# Patient Record
Sex: MALE | Race: WHITE | NOT HISPANIC OR LATINO | URBAN - METROPOLITAN AREA
[De-identification: names, ages, dates, MRNs, and addresses within clinical notes are randomized per-mention and may not be internally consistent; named-entity substitution may affect disease eponyms.]

---

## 2018-07-13 ENCOUNTER — EMERGENCY (EMERGENCY)
Facility: HOSPITAL | Age: 53
LOS: 1 days | Discharge: HOME | End: 2018-07-13
Attending: EMERGENCY MEDICINE | Admitting: EMERGENCY MEDICINE
Payer: COMMERCIAL

## 2018-07-13 VITALS
RESPIRATION RATE: 18 BRPM | SYSTOLIC BLOOD PRESSURE: 109 MMHG | DIASTOLIC BLOOD PRESSURE: 67 MMHG | TEMPERATURE: 99 F | HEIGHT: 74 IN | OXYGEN SATURATION: 100 % | HEART RATE: 64 BPM | WEIGHT: 210.1 LBS

## 2018-07-13 DIAGNOSIS — S61.011A LACERATION WITHOUT FOREIGN BODY OF RIGHT THUMB WITHOUT DAMAGE TO NAIL, INITIAL ENCOUNTER: ICD-10-CM

## 2018-07-13 PROCEDURE — 12001 RPR S/N/AX/GEN/TRNK 2.5CM/<: CPT

## 2018-07-13 PROCEDURE — 99283 EMERGENCY DEPT VISIT LOW MDM: CPT | Mod: 25

## 2018-07-13 PROCEDURE — 99282 EMERGENCY DEPT VISIT SF MDM: CPT | Mod: 25

## 2018-07-13 PROCEDURE — 90471 IMMUNIZATION ADMIN: CPT

## 2018-07-13 PROCEDURE — 90715 TDAP VACCINE 7 YRS/> IM: CPT

## 2018-07-13 RX ORDER — TETANUS TOXOID, REDUCED DIPHTHERIA TOXOID AND ACELLULAR PERTUSSIS VACCINE, ADSORBED 5; 2.5; 8; 8; 2.5 [IU]/.5ML; [IU]/.5ML; UG/.5ML; UG/.5ML; UG/.5ML
0.5 SUSPENSION INTRAMUSCULAR ONCE
Qty: 0 | Refills: 0 | Status: COMPLETED | OUTPATIENT
Start: 2018-07-13 | End: 2018-07-13

## 2018-07-13 RX ADMIN — TETANUS TOXOID, REDUCED DIPHTHERIA TOXOID AND ACELLULAR PERTUSSIS VACCINE, ADSORBED 0.5 MILLILITER(S): 5; 2.5; 8; 8; 2.5 SUSPENSION INTRAMUSCULAR at 23:54

## 2018-07-13 NOTE — ED ADULT NURSE NOTE - OBJECTIVE STATEMENT
Pt presents with laceration in space between 1st and 2nd finger of right hand. pt states he was washing dishes and cut himself with a knife, moving all fingers purposefully on injured hand, bleeding controlled on arrival

## 2018-07-14 NOTE — ED PROVIDER NOTE - ATTENDING CONTRIBUTION TO CARE
pt co cut to R thumb today just PTA. assoc c bleeding no numbnesstingling/weakness    exam: R 1st finger c 1.5cm linear lac over prox phalanx, palmar side. slight bleeding. no tendon/ligament involvement.  5/5 strength flex/extension IP/MCP.    AP thumb lac. no tendon/ligament involvement. suture closure.

## 2018-07-14 NOTE — ED PROVIDER NOTE - PLAN OF CARE
Follow up with your primary care provider within 48-72 hours for wound check. Keep sutures covered and dry for 24 hours then clean with soap and water daily.  Apply bacitracin and cover.  Return to ED for suture removal 7 days. Any increased pain, redness, streaking (red lines), swelling, fever, chills return to ER.

## 2018-11-14 ENCOUNTER — APPOINTMENT (OUTPATIENT)
Dept: INTERNAL MEDICINE | Facility: CLINIC | Age: 53
End: 2018-11-14
Payer: COMMERCIAL

## 2018-11-14 ENCOUNTER — NON-APPOINTMENT (OUTPATIENT)
Age: 53
End: 2018-11-14

## 2018-11-14 VITALS
HEIGHT: 74 IN | WEIGHT: 212 LBS | BODY MASS INDEX: 27.21 KG/M2 | DIASTOLIC BLOOD PRESSURE: 74 MMHG | SYSTOLIC BLOOD PRESSURE: 124 MMHG | RESPIRATION RATE: 15 BRPM | HEART RATE: 68 BPM

## 2018-11-14 VITALS
TEMPERATURE: 97.9 F | SYSTOLIC BLOOD PRESSURE: 111 MMHG | WEIGHT: 212 LBS | DIASTOLIC BLOOD PRESSURE: 70 MMHG | HEART RATE: 88 BPM | RESPIRATION RATE: 14 BRPM | BODY MASS INDEX: 27.21 KG/M2 | OXYGEN SATURATION: 100 % | HEIGHT: 74 IN

## 2018-11-14 DIAGNOSIS — Z12.11 ENCOUNTER FOR SCREENING FOR MALIGNANT NEOPLASM OF COLON: ICD-10-CM

## 2018-11-14 DIAGNOSIS — M25.561 PAIN IN RIGHT KNEE: ICD-10-CM

## 2018-11-14 DIAGNOSIS — R00.2 PALPITATIONS: ICD-10-CM

## 2018-11-14 DIAGNOSIS — Z78.9 OTHER SPECIFIED HEALTH STATUS: ICD-10-CM

## 2018-11-14 DIAGNOSIS — Z83.6 FAMILY HISTORY OF OTHER DISEASES OF THE RESPIRATORY SYSTEM: ICD-10-CM

## 2018-11-14 PROCEDURE — 93000 ELECTROCARDIOGRAM COMPLETE: CPT

## 2018-11-14 PROCEDURE — 99213 OFFICE O/P EST LOW 20 MIN: CPT | Mod: 25

## 2018-11-14 PROCEDURE — 99386 PREV VISIT NEW AGE 40-64: CPT | Mod: 25

## 2018-11-14 NOTE — HEALTH RISK ASSESSMENT
[No falls in past year] : Patient reported no falls in the past year [0] : 2) Feeling down, depressed, or hopeless: Not at all (0) [[id number]] : 0

## 2018-11-14 NOTE — PHYSICAL EXAM
[No Acute Distress] : no acute distress [Well Nourished] : well nourished [Well Developed] : well developed [Well-Appearing] : well-appearing [Normal Voice/Communication] : normal voice/communication [Normal Sclera/Conjunctiva] : normal sclera/conjunctiva [PERRL] : pupils equal round and reactive to light [EOMI] : extraocular movements intact [Normal Outer Ear/Nose] : the outer ears and nose were normal in appearance [Normal Oropharynx] : the oropharynx was normal [No JVD] : no jugular venous distention [Supple] : supple [No Lymphadenopathy] : no lymphadenopathy [Thyroid Normal, No Nodules] : the thyroid was normal and there were no nodules present [No Respiratory Distress] : no respiratory distress  [Clear to Auscultation] : lungs were clear to auscultation bilaterally [No Accessory Muscle Use] : no accessory muscle use [Normal Rate] : normal rate  [Regular Rhythm] : with a regular rhythm [Normal S1, S2] : normal S1 and S2 [No Murmur] : no murmur heard [No Carotid Bruits] : no carotid bruits [No Abdominal Bruit] : a ~M bruit was not heard ~T in the abdomen [No Varicosities] : no varicosities [Pedal Pulses Present] : the pedal pulses are present [No Edema] : there was no peripheral edema [No Extremity Clubbing/Cyanosis] : no extremity clubbing/cyanosis [No Palpable Aorta] : no palpable aorta [Soft] : abdomen soft [Non Tender] : non-tender [Non-distended] : non-distended [No Masses] : no abdominal mass palpated [No HSM] : no HSM [Normal Bowel Sounds] : normal bowel sounds [Normal Posterior Cervical Nodes] : no posterior cervical lymphadenopathy [Normal Anterior Cervical Nodes] : no anterior cervical lymphadenopathy [No CVA Tenderness] : no CVA  tenderness [No Spinal Tenderness] : no spinal tenderness [No Joint Swelling] : no joint swelling [Grossly Normal Strength/Tone] : grossly normal strength/tone [No Rash] : no rash [Normal Gait] : normal gait [Coordination Grossly Intact] : coordination grossly intact [No Focal Deficits] : no focal deficits [Deep Tendon Reflexes (DTR)] : deep tendon reflexes were 2+ and symmetric [Normal Affect] : the affect was normal [Normal Insight/Judgement] : insight and judgment were intact [Normal TMs] : both tympanic membranes were normal [Normal Nasal Mucosa] : the nasal mucosa was normal [Declined Breast Exam] : declined breast exam

## 2018-11-14 NOTE — ASSESSMENT
[FreeTextEntry1] : Physical exam shows a well-developed male in no acute distress blood pressure 124/74 weight 212 pounds heart rate 68 respirations 15 HEENT was unremarkable chest was clear cardiovascular exam shows a normal S1 and S2 with no extra heart sounds or murmurs abdomen was soft and nontender extremities shows no clubbing cyanosis or edema neurologic exam was nonfocal EKG showed a normal sinus rhythm with no acute ST-T wave changes/normal record a colonoscopy was scheduled in [facility] prep was gone over with the patient and all his questions were answered he is aware of the need for a ride home from the procedure to stop all blood thinning medications herbs and vitamins 7 days before the risk of perforation and bleeding were reviewed and he is aware of these risks he is up-to-date with his ophthalmologist and dermatologists slip was given for complete blood tests including PSA lipid profile and we will do an A1c CBC full chemistries and a D3 and B12

## 2018-11-14 NOTE — HISTORY OF PRESENT ILLNESS
[FreeTextEntry1] : 53-year-old man comes in for a comprehensive physical examination and to get a slip for blood tests and to schedule a colonoscopy [de-identified] : 53-year-old man comes to the office for a comprehensive physical examination he denies temperature chills sweats myalgias headache sinus congestion sore throat cough wheezing chest pain pleurisy shortness of breath exertional dyspnea lightheadedness vertigo dizziness or syncope he's had no dysuria no abdominal pain nausea vomiting diarrhea bright red blood per rectum or black stools he denies significant musculoskeletal pain although he has chronic pain in his right knee he has had previous surgery for torn meniscus 3 times appetite has been good his weight is stable he is an active man who exercises regularly

## 2018-11-14 NOTE — REVIEW OF SYSTEMS
[Nocturia] : nocturia [Joint Pain] : joint pain [Fever] : no fever [Chills] : no chills [Fatigue] : no fatigue [Hot Flashes] : no hot flashes [Night Sweats] : no night sweats [Recent Change In Weight] : ~T no recent weight change [Discharge] : no discharge [Pain] : no pain [Redness] : no redness [Dryness] : no dryness [Vision Problems] : no vision problems [Itching] : no itching [Earache] : no earache [Hearing Loss] : no hearing loss [Nosebleeds] : no nosebleeds [Postnasal Drip] : postnasal drip [Nasal Discharge] : no nasal discharge [Sore Throat] : no sore throat [Hoarseness] : no hoarseness [Chest Pain] : no chest pain [Palpitations] : no palpitations [Claudication] : no  leg claudication [Lower Ext Edema] : no lower extremity edema [Orthopena] : no orthopnea [Paroysmal Nocturnal Dyspnea] : no paroysmal nocturnal dyspnea [Shortness Of Breath] : no shortness of breath [Wheezing] : no wheezing [Cough] : no cough [Dyspnea on Exertion] : not dyspnea on exertion [Abdominal Pain] : no abdominal pain [Nausea] : no nausea [Constipation] : no constipation [Diarrhea] : no diarrhea [Vomiting] : no vomiting [Heartburn] : no heartburn [Melena] : no melena [Dysuria] : no dysuria [Incontinence] : no incontinence [Hesitancy] : no hesitancy [Hematuria] : no hematuria [Frequency] : no frequency [Impotence] : no impotency [Poor Libido] : libido not poor [Joint Stiffness] : no joint stiffness [Muscle Pain] : no muscle pain [Muscle Weakness] : no muscle weakness [Back Pain] : no back pain [Joint Swelling] : no joint swelling [Mole Changes] : no mole changes [Nail Changes] : no nail changes [Hair Changes] : no hair changes [Skin Rash] : no skin rash [Headache] : no headache [Dizziness] : no dizziness [Fainting] : no fainting [Confusion] : no confusion [Unsteady Walk] : no ataxia [Memory Loss] : no memory loss [Suicidal] : not suicidal [Insomnia] : no insomnia [Anxiety] : no anxiety [Depression] : no depression [Easy Bleeding] : no easy bleeding [Swollen Glands] : no swollen glands

## 2019-01-17 ENCOUNTER — MEDICATION RENEWAL (OUTPATIENT)
Age: 54
End: 2019-01-17

## 2019-01-17 DIAGNOSIS — Z20.828 CONTACT WITH AND (SUSPECTED) EXPOSURE TO OTHER VIRAL COMMUNICABLE DISEASES: ICD-10-CM

## 2019-01-17 RX ORDER — OSELTAMIVIR PHOSPHATE 75 MG/1
75 CAPSULE ORAL DAILY
Qty: 10 | Refills: 0 | Status: ACTIVE | COMMUNITY
Start: 2019-01-17 | End: 1900-01-01

## 2019-01-18 LAB
25(OH)D3 SERPL-MCNC: 18.6 NG/ML
ALBUMIN SERPL ELPH-MCNC: 4.2 G/DL
ALP BLD-CCNC: 38 U/L
ALT SERPL-CCNC: 13 U/L
ANION GAP SERPL CALC-SCNC: 11 MMOL/L
APPEARANCE: CLEAR
AST SERPL-CCNC: 13 U/L
BASOPHILS # BLD AUTO: 0.04 K/UL
BASOPHILS NFR BLD AUTO: 0.6 %
BILIRUB SERPL-MCNC: 0.8 MG/DL
BILIRUBIN URINE: NEGATIVE
BLOOD URINE: NEGATIVE
BUN SERPL-MCNC: 11 MG/DL
CALCIUM SERPL-MCNC: 9.3 MG/DL
CHLORIDE SERPL-SCNC: 100 MMOL/L
CHOLEST SERPL-MCNC: 191 MG/DL
CHOLEST/HDLC SERPL: 2.7 RATIO
CO2 SERPL-SCNC: 28 MMOL/L
COLOR: YELLOW
CREAT SERPL-MCNC: 1.17 MG/DL
CRP SERPL HS-MCNC: <0.2 MG/L
EOSINOPHIL # BLD AUTO: 0.09 K/UL
EOSINOPHIL NFR BLD AUTO: 1.4 %
GLUCOSE BS SERPL-MCNC: 78 MG/DL
GLUCOSE QUALITATIVE U: NEGATIVE MG/DL
GLUCOSE SERPL-MCNC: 81 MG/DL
HBA1C MFR BLD HPLC: 5.4 %
HCT VFR BLD CALC: 46.4 %
HDLC SERPL-MCNC: 71 MG/DL
HGB BLD-MCNC: 15.2 G/DL
IMM GRANULOCYTES NFR BLD AUTO: 0.3 %
KETONES URINE: NEGATIVE
LDLC SERPL CALC-MCNC: 107 MG/DL
LEUKOCYTE ESTERASE URINE: NEGATIVE
LYMPHOCYTES # BLD AUTO: 1.82 K/UL
LYMPHOCYTES NFR BLD AUTO: 29.3 %
MAN DIFF?: NORMAL
MCHC RBC-ENTMCNC: 29.9 PG
MCHC RBC-ENTMCNC: 32.8 GM/DL
MCV RBC AUTO: 91.3 FL
MONOCYTES # BLD AUTO: 0.69 K/UL
MONOCYTES NFR BLD AUTO: 11.1 %
NEUTROPHILS # BLD AUTO: 3.55 K/UL
NEUTROPHILS NFR BLD AUTO: 57.3 %
NITRITE URINE: NEGATIVE
PH URINE: 7
PLATELET # BLD AUTO: 298 K/UL
POTASSIUM SERPL-SCNC: 3.9 MMOL/L
PROT SERPL-MCNC: 6.9 G/DL
PROTEIN URINE: NEGATIVE MG/DL
PSA FREE FLD-MCNC: 46 %
PSA FREE SERPL-MCNC: 0.15 NG/ML
PSA SERPL-MCNC: 0.33 NG/ML
RBC # BLD: 5.08 M/UL
RBC # FLD: 13.2 %
SODIUM SERPL-SCNC: 139 MMOL/L
SPECIFIC GRAVITY URINE: 1.01
T4 FREE SERPL-MCNC: 1.2 NG/DL
TRIGL SERPL-MCNC: 65 MG/DL
TSH SERPL-ACNC: 3.4 UIU/ML
UROBILINOGEN URINE: NEGATIVE MG/DL
VIT B12 SERPL-MCNC: 259 PG/ML
WBC # FLD AUTO: 6.21 K/UL

## 2020-05-29 ENCOUNTER — EMERGENCY (EMERGENCY)
Facility: HOSPITAL | Age: 55
LOS: 1 days | Discharge: HOME | End: 2020-05-29
Attending: EMERGENCY MEDICINE | Admitting: EMERGENCY MEDICINE
Payer: COMMERCIAL

## 2020-05-29 VITALS
OXYGEN SATURATION: 97 % | HEART RATE: 79 BPM | HEIGHT: 74 IN | WEIGHT: 195.11 LBS | SYSTOLIC BLOOD PRESSURE: 121 MMHG | DIASTOLIC BLOOD PRESSURE: 78 MMHG | TEMPERATURE: 99 F | RESPIRATION RATE: 18 BRPM

## 2020-05-29 DIAGNOSIS — Z20.3 CONTACT WITH AND (SUSPECTED) EXPOSURE TO RABIES: ICD-10-CM

## 2020-05-29 PROCEDURE — 90675 RABIES VACCINE IM: CPT

## 2020-05-29 PROCEDURE — 90375 RABIES IG IM/SC: CPT

## 2020-05-29 PROCEDURE — 99283 EMERGENCY DEPT VISIT LOW MDM: CPT

## 2020-05-29 PROCEDURE — 90471 IMMUNIZATION ADMIN: CPT

## 2020-05-29 PROCEDURE — 99283 EMERGENCY DEPT VISIT LOW MDM: CPT | Mod: 25

## 2020-05-29 RX ORDER — HUMAN RABIES VIRUS IMMUNE GLOBULIN 150 [IU]/ML
1750 INJECTION, SOLUTION INTRAMUSCULAR ONCE
Refills: 0 | Status: DISCONTINUED | OUTPATIENT
Start: 2020-05-29 | End: 2020-05-29

## 2020-05-29 RX ORDER — RABIES VACC, HUMAN DIPLOID/PF 2.5 UNIT
1 VIAL (EA) INTRAMUSCULAR ONCE
Refills: 0 | Status: COMPLETED | OUTPATIENT
Start: 2020-05-29 | End: 2020-05-29

## 2020-05-29 RX ORDER — HUMAN RABIES VIRUS IMMUNE GLOBULIN 150 [IU]/ML
1750 INJECTION, SOLUTION INTRAMUSCULAR ONCE
Refills: 0 | Status: COMPLETED | OUTPATIENT
Start: 2020-05-29 | End: 2020-05-29

## 2020-05-29 RX ADMIN — HUMAN RABIES VIRUS IMMUNE GLOBULIN 1750 UNIT(S): 150 INJECTION, SOLUTION INTRAMUSCULAR at 16:34

## 2020-05-29 RX ADMIN — Medication 1 MILLILITER(S): at 16:36

## 2020-05-29 NOTE — ED PROVIDER NOTE - NSFOLLOWUPINSTRUCTIONS_ED_ALL_ED_FT
Follow up with your PMD within 1-2 days.  Return for the rabies vaccine day 3 (5/31), 7 (6/5), 14 (6/12)  Worsening, continued or ANY new concerning symptoms return to the emergency department.   *****************    Rabies Vaccine: What You Need to Know  1. Why get vaccinated?  Rabies vaccine can prevent rabies.  Rabies is mainly a disease of animals. Humans get rabies when they are bitten or scratched by infected animals.  Human rabies is rare in the [place]. Wild animals like bats, raccoons, skunks, and foxes are the most common source of human rabies infection in the [place].Rabies is more common in other parts of the world where dogs still carry rabies. Most rabies deaths in people around the world are caused by bites from unvaccinated dogs.Rabies infects the central nervous system. After infection with rabies, at first there might not be any symptoms. Weeks or even months after a bite, rabies can cause general weakness or discomfort, fever, or headache. As the disease progresses, the person may experience delirium, abnormal behavior, hallucinations, hydrophobia (fear of water), and insomnia.  If a person does not receive appropriate medical care after an exposure, human rabies is almost always fatal.  Rabies can be prevented by vaccinating pets, staying away from wildlife, and seeking medical care after potential exposures and before symptoms start.  2. Rabies vaccine  Rabies vaccine is given to people at high risk of rabies to protect them if they are exposed. People at high risk of exposure to rabies should be offered pre-exposure rabies vaccination, including:  Veterinarians, animal handlers, and veterinary students Rabies laboratory workers [employer]el[employer]ers (people who explore caves), and Persons who work with live vaccine to produce rabies vaccine and rabies immune globulin.Pre-exposure rabies vaccination should also be considered for:  People whose activities bring them into frequent contact with rabies virus or with possibly rabid animals. International travelers who are likely to come in contact with animals in parts of the world where rabies is common and immediate access to appropriate care is limited.For pre-exposure protection, 3 doses of rabies vaccine are recommended. People who may be repeatedly exposed to rabies virus should receive periodic testing for immunity, and booster doses might be necessary. Your health care provider can give you more details.  Rabies vaccine can prevent rabies if given to a person after they have had an exposure. Anyone who has been bitten by an animal suspected to have rabies, or who otherwise may have been exposed to rabies, should clean the wound and see a health care provider immediately regardless of vaccination status. The health care provider can help determine if the person should receive post-exposure rabies vaccination.  For post-exposure protection:  A person who is exposed and has never been vaccinated against rabies should get 4 doses of rabies vaccine. The person should also get another shot called rabies immune globulin (RIG). A person who has been previously vaccinated should get 2 doses of rabies vaccine and does not need Rabies Immune Globulin.Your health care provider can give you more information.  3. Talk with your health care provider  Tell your vaccine provider if the person getting the vaccine:   Has had an allergic reaction after a previous dose of rabies vaccine, or has any severe, life-threatening allergies.Has a weakened immune system.In some cases, your health care provider may decide to postpone a routine (non-exposure) dose of rabies vaccination to a future visit.   People with minor illnesses, such as a cold, may be vaccinated. People who are moderately or severely ill should usually wait until they recover before getting a routine (non-exposure) dose of rabies vaccine. If you have been exposed to rabies virus, you should get vaccinated regardless of concurrent illnesses, pregnancy, or breastfeeding.  Your health care provider can give you more information.  4. Risks of a vaccine reaction  Soreness, redness, swelling, or itching at the site of the injection, and headache, nausea, abdominal pain, muscle aches, or dizziness can happen after rabies vaccine. Hives, pain in the joints, or fever sometimes happen after booster doses. Very rarely, nervous system disorders such as Guillain–Barré syndrome (GBS) have been reported after rabies vaccine.People sometimes faint after medical procedures, including vaccination. Tell your provider if you feel dizzy or have vision changes or ringing in the ears.  As with any medicine, there is a very remote chance of a vaccine causing a severe allergic reaction, other serious injury, or death.  5. What if there is a serious problem?  An allergic reaction could occur after the vaccinated person leaves the clinic. If you see signs of a severe allergic reaction (hives, swelling of the face and throat, difficulty breathing, a fast heartbeat, dizziness, or weakness), call 9-1-1 and get the person to the nearest hospital.  For other signs that concern you, call your health care provider.   Adverse reactions should be reported to the Vaccine Adverse Event Reporting System (VAERS). Your health care provider will usually file this report, or you can do it yourself. Visit the VAERS website at www.vaers.[facility].gov or call 1-752.793.5729. VAERS is only for reporting reactions, and VAERS staff do not give medical advice.  6. How can I learn more?  Ask your health care provider. Call your local or state health department. Contact the Centers for Disease Control and Prevention (CDC):  Call 1-751.635.8881 (0-028-FQJ-INFO) orVisit CDC's rabies website at www.cdc.gov/rabiesVaccine Information Statement Rabies Vaccine (01/08/2020)  This information is not intended to replace advice given to you by your health care provider. Make sure you discuss any questions you have with your health care provider. Follow up with your PMD within 1-2 days.  Return for the rabies vaccine day 3 (6/1), 7 (6/5), 14 (6/12)  Worsening, continued or ANY new concerning symptoms return to the emergency department.   *****************    Rabies Vaccine: What You Need to Know  1. Why get vaccinated?  Rabies vaccine can prevent rabies.  Rabies is mainly a disease of animals. Humans get rabies when they are bitten or scratched by infected animals.  Human rabies is rare in the [place]. Wild animals like bats, raccoons, skunks, and foxes are the most common source of human rabies infection in the [place].Rabies is more common in other parts of the world where dogs still carry rabies. Most rabies deaths in people around the world are caused by bites from unvaccinated dogs.Rabies infects the central nervous system. After infection with rabies, at first there might not be any symptoms. Weeks or even months after a bite, rabies can cause general weakness or discomfort, fever, or headache. As the disease progresses, the person may experience delirium, abnormal behavior, hallucinations, hydrophobia (fear of water), and insomnia.  If a person does not receive appropriate medical care after an exposure, human rabies is almost always fatal.  Rabies can be prevented by vaccinating pets, staying away from wildlife, and seeking medical care after potential exposures and before symptoms start.  2. Rabies vaccine  Rabies vaccine is given to people at high risk of rabies to protect them if they are exposed. People at high risk of exposure to rabies should be offered pre-exposure rabies vaccination, including:  Veterinarians, animal handlers, and veterinary students Rabies laboratory workers [employer] (people who explore caves), and Persons who work with live vaccine to produce rabies vaccine and rabies immune globulin.Pre-exposure rabies vaccination should also be considered for:  People whose activities bring them into frequent contact with rabies virus or with possibly rabid animals. International travelers who are likely to come in contact with animals in parts of the world where rabies is common and immediate access to appropriate care is limited.For pre-exposure protection, 3 doses of rabies vaccine are recommended. People who may be repeatedly exposed to rabies virus should receive periodic testing for immunity, and booster doses might be necessary. Your health care provider can give you more details.  Rabies vaccine can prevent rabies if given to a person after they have had an exposure. Anyone who has been bitten by an animal suspected to have rabies, or who otherwise may have been exposed to rabies, should clean the wound and see a health care provider immediately regardless of vaccination status. The health care provider can help determine if the person should receive post-exposure rabies vaccination.  For post-exposure protection:  A person who is exposed and has never been vaccinated against rabies should get 4 doses of rabies vaccine. The person should also get another shot called rabies immune globulin (RIG). A person who has been previously vaccinated should get 2 doses of rabies vaccine and does not need Rabies Immune Globulin.Your health care provider can give you more information.  3. Talk with your health care provider  Tell your vaccine provider if the person getting the vaccine:   Has had an allergic reaction after a previous dose of rabies vaccine, or has any severe, life-threatening allergies.Has a weakened immune system.In some cases, your health care provider may decide to postpone a routine (non-exposure) dose of rabies vaccination to a future visit.   People with minor illnesses, such as a cold, may be vaccinated. People who are moderately or severely ill should usually wait until they recover before getting a routine (non-exposure) dose of rabies vaccine. If you have been exposed to rabies virus, you should get vaccinated regardless of concurrent illnesses, pregnancy, or breastfeeding.  Your health care provider can give you more information.  4. Risks of a vaccine reaction  Soreness, redness, swelling, or itching at the site of the injection, and headache, nausea, abdominal pain, muscle aches, or dizziness can happen after rabies vaccine. Hives, pain in the joints, or fever sometimes happen after booster doses. Very rarely, nervous system disorders such as Guillain–Barré syndrome (GBS) have been reported after rabies vaccine.People sometimes faint after medical procedures, including vaccination. Tell your provider if you feel dizzy or have vision changes or ringing in the ears.  As with any medicine, there is a very remote chance of a vaccine causing a severe allergic reaction, other serious injury, or death.  5. What if there is a serious problem?  An allergic reaction could occur after the vaccinated person leaves the clinic. If you see signs of a severe allergic reaction (hives, swelling of the face and throat, difficulty breathing, a fast heartbeat, dizziness, or weakness), call 9-1-1 and get the person to the nearest hospital.  For other signs that concern you, call your health care provider.   Adverse reactions should be reported to the Vaccine Adverse Event Reporting System (VAERS). Your health care provider will usually file this report, or you can do it yourself. Visit the VAERS website at www.vaers.[facility].gov or call 1-695.631.4219. VAERS is only for reporting reactions, and VAERS staff do not give medical advice.  6. How can I learn more?  Ask your health care provider. Call your local or state health department. Contact the Centers for Disease Control and Prevention (CDC):  Call 1-285.889.1552 (9-110-VSX-INFO) orVisit CDC's rabies website at www.cdc.gov/rabiesVaccine Information Statement Rabies Vaccine (01/08/2020)  This information is not intended to replace advice given to you by your health care provider. Make sure you discuss any questions you have with your health care provider.

## 2020-05-29 NOTE — ED ADULT NURSE NOTE - PAIN RATING/NUMBER SCALE (0-10): ACTIVITY
Airway  Urgency: elective    Date/Time: 11/12/2019 8:31 AM  Airway not difficult    General Information and Staff    Patient location during procedure: OR  Anesthesiologist: Byron Naranjo MD  CRNA: Darian Acevedo CRNA    Indications and Patient Condition  Indications for airway management: airway protection    Preoxygenated: yes  Mask difficulty assessment: 1 - vent by mask    Final Airway Details  Final airway type: supraglottic airway      Successful airway: unique  Size 5    Number of attempts at approach: 1               0

## 2020-05-29 NOTE — ED PROVIDER NOTE - CLINICAL SUMMARY MEDICAL DECISION MAKING FREE TEXT BOX
55 y/o M with no reported PMH presents to the ED for evaluation s/p a bat was found in the house last night. Patient reports his daughter noticed something flying 2 night ago, but they didn't find anything when the house was searched. Last night they found 2 bats flying inside her room, they were able to mayur it out of the house. Today pest control came and searched the house, no more bats found. No reports to bites for injury to the skin, no f/c, CP, SOB or all other symptoms. PE as noted above. patient asymptomatic. Will give rabies vaccination and immunoglobulin and dc

## 2020-05-29 NOTE — ED PROVIDER NOTE - ATTENDING CONTRIBUTION TO CARE
Maranda with NARCISO Beauchamp. 55 y/o M with no reported PMH presents to the ED for evaluation s/p a bat was found in the house last night. Patient reports his daughter noticed something flying 2 night ago, but they didn't find anything when the house was searched. Last night they found 2 bats flying inside her room, they were able to mayur it out of the house. Today pest control came and searched the house, no more bats found. No reports to bites for injury to the skin, no f/c, CP, SOB or all other symptoms. PE as noted above. patient asymptomatic. Will give rabies vaccination and immunoglobulin and dc  I performed a face to face bedside interview with patient regarding history of present illness, review of symptoms and past medical history. I completed an independent physical exam.  I have discussed the patient's plan of care with Physician Assistant (PA). I agree with note as stated above, having amended the EMR as needed to reflect my findings.   This includes History of Present Illness, HIV, Past Medical/Surgical/Family/Social History, Allergies and Home Medications, Review of Systems, Physical Exam, and any Progress Notes during the time I functioned as the attending physician for this patient.

## 2020-05-29 NOTE — ED PROVIDER NOTE - OBJECTIVE STATEMENT
55 y/o M with no reported PMH presents to the ED for 53 y/o M with no reported PMH presents to the ED for evaluation s/p a bat was found in the house last night. Patient reports his daughter noticed something flying 2 night ago, but they didn't find anything when the house was searched. Last night they found 2 bats flying inside her room, they were able to mayur it out of the house. Today pest control came and searched the house, no more bats found. No reports to bites for injury to the skin, no f/c, CP, SOB or all other symptoms

## 2020-05-29 NOTE — ED PROVIDER NOTE - PATIENT PORTAL LINK FT
You can access the FollowMyHealth Patient Portal offered by [facility] by registering at the following website: [email or web address]. By joining [facility]’s FollowMyHealth portal, you will also be able to view your health information using other applications (apps) compatible with our system.

## 2020-05-29 NOTE — ED ADULT TRIAGE NOTE - CHIEF COMPLAINT QUOTE
Pt. was told to come to the ED to get checked for possible contact with bat that came onto his home.

## 2020-05-29 NOTE — ED ADULT NURSE NOTE - OBJECTIVE STATEMENT
Patient presents to ED alert and oriented x3 with c/o possible contact with bat inside of house. Patient states daughter saw something flying in house 2 nights ago, yesterday noticed it was a bat. Patient was able to mayur bat out of house. Denies physical contact or bite with animal. Rabies vaccination administered with no complications. Education on rabies vaccination and immunoglobin given to patient with understanding.

## 2020-06-01 ENCOUNTER — EMERGENCY (EMERGENCY)
Facility: HOSPITAL | Age: 55
LOS: 1 days | Discharge: HOME | End: 2020-06-01
Attending: EMERGENCY MEDICINE | Admitting: EMERGENCY MEDICINE
Payer: COMMERCIAL

## 2020-06-01 VITALS
OXYGEN SATURATION: 97 % | HEIGHT: 74 IN | RESPIRATION RATE: 17 BRPM | WEIGHT: 195.11 LBS | DIASTOLIC BLOOD PRESSURE: 73 MMHG | HEART RATE: 62 BPM | TEMPERATURE: 98 F | SYSTOLIC BLOOD PRESSURE: 139 MMHG

## 2020-06-01 PROCEDURE — 99283 EMERGENCY DEPT VISIT LOW MDM: CPT

## 2020-06-01 PROCEDURE — 90675 RABIES VACCINE IM: CPT

## 2020-06-01 PROCEDURE — 99283 EMERGENCY DEPT VISIT LOW MDM: CPT | Mod: 25

## 2020-06-01 RX ORDER — RABIES VACC, HUMAN DIPLOID/PF 2.5 UNIT
1 VIAL (EA) INTRAMUSCULAR ONCE
Refills: 0 | Status: COMPLETED | OUTPATIENT
Start: 2020-06-01 | End: 2020-06-01

## 2020-06-01 RX ADMIN — Medication 1 MILLILITER(S): at 16:16

## 2020-06-01 NOTE — ED PROVIDER NOTE - CLINICAL SUMMARY MEDICAL DECISION MAKING FREE TEXT BOX
Pt with 2nd visit for possible rabies exposure, infection control informed, will give rabies vaccine.

## 2020-06-01 NOTE — ED PROVIDER NOTE - ATTENDING CONTRIBUTION TO CARE
Dr. Couch: I performed a face to face bedside interview with patient regarding history of present illness, review of symptoms and past medical history. I completed an independent physical exam.  I have discussed patient's plan of care with PA.   I agree with note as stated above, having amended the EMR as needed to reflect my findings.   This includes HISTORY OF PRESENT ILLNESS, HIV, PAST MEDICAL/SURGICAL/FAMILY/SOCIAL HISTORY, ALLERGIES AND HOME MEDICATIONS, REVIEW OF SYSTEMS, PHYSICAL EXAM, and any PROGRESS NOTES during the time I functioned as the attending physician for this patient.    Dr. Couch: This H&P has been written by myself in its entirety

## 2020-06-01 NOTE — ED ADULT NURSE NOTE - NSSUHOSCREENINGYN_ED_ALL_ED
- Continue Ondansetron q8hrs PRN   - Hydroxyzine q6hrs PRN breakthrough nausea Yes - the patient is able to be screened

## 2020-06-01 NOTE — ED ADULT NURSE NOTE - NSIMPLEMENTINTERV_GEN_ALL_ED
Implemented All Universal Safety Interventions:  [place] to call system. Call bell, personal items and telephone within reach. Instruct patient to call for assistance. Room bathroom lighting operational. Non-slip footwear when patient is off stretcher. Physically safe environment: no spills, clutter or unnecessary equipment. Stretcher in lowest position, wheels locked, appropriate side rails in place.

## 2020-06-01 NOTE — ED PROVIDER NOTE - PROGRESS NOTE DETAILS
NARCISO Ding: Pt seen and examined with Dr. Couch- pt presents for second rabies vaccine, no known injuries. No reaction to first vaccine.

## 2020-06-01 NOTE — ED PROVIDER NOTE - NSFOLLOWUPINSTRUCTIONS_ED_ALL_ED_FT
Follow up with your PMD within 1-2 days.  Return for the rabies vaccine day 7 (6/5), 14 (6/12)  Worsening, continued or ANY new concerning symptoms return to the emergency department.   *****************    Rabies Vaccine: What You Need to Know  1. Why get vaccinated?  Rabies vaccine can prevent rabies.  Rabies is mainly a disease of animals. Humans get rabies when they are bitten or scratched by infected animals.  Human rabies is rare in the [place]. Wild animals like bats, raccoons, skunks, and foxes are the most common source of human rabies infection in the [place].Rabies is more common in other parts of the world where dogs still carry rabies. Most rabies deaths in people around the world are caused by bites from unvaccinated dogs.Rabies infects the central nervous system. After infection with rabies, at first there might not be any symptoms. Weeks or even months after a bite, rabies can cause general weakness or discomfort, fever, or headache. As the disease progresses, the person may experience delirium, abnormal behavior, hallucinations, hydrophobia (fear of water), and insomnia.  If a person does not receive appropriate medical care after an exposure, human rabies is almost always fatal.  Rabies can be prevented by vaccinating pets, staying away from wildlife, and seeking medical care after potential exposures and before symptoms start.  2. Rabies vaccine  Rabies vaccine is given to people at high risk of rabies to protect them if they are exposed. People at high risk of exposure to rabies should be offered pre-exposure rabies vaccination, including:  Veterinarians, animal handlers, and veterinary students Rabies laboratory workers Spelunkers (people who explore caves), and Persons who work with live vaccine to produce rabies vaccine and rabies immune globulin.Pre-exposure rabies vaccination should also be considered for:  People whose activities bring them into frequent contact with rabies virus or with possibly rabid animals. International travelers who are likely to come in contact with animals in parts of the world where rabies is common and immediate access to appropriate care is limited.For pre-exposure protection, 3 doses of rabies vaccine are recommended. People who may be repeatedly exposed to rabies virus should receive periodic testing for immunity, and booster doses might be necessary. Your health care provider can give you more details.  Rabies vaccine can prevent rabies if given to a person after they have had an exposure. Anyone who has been bitten by an animal suspected to have rabies, or who otherwise may have been exposed to rabies, should clean the wound and see a health care provider immediately regardless of vaccination status. The health care provider can help determine if the person should receive post-exposure rabies vaccination.  For post-exposure protection:  A person who is exposed and has never been vaccinated against rabies should get 4 doses of rabies vaccine. The person should also get another shot called rabies immune globulin (RIG). A person who has been previously vaccinated should get 2 doses of rabies vaccine and does not need Rabies Immune Globulin.Your health care provider can give you more information.  3. Talk with your health care provider  Tell your vaccine provider if the person getting the vaccine:   Has had an allergic reaction after a previous dose of rabies vaccine, or has any severe, life-threatening allergies.Has a weakened immune system.In some cases, your health care provider may decide to postpone a routine (non-exposure) dose of rabies vaccination to a future visit.   People with minor illnesses, such as a cold, may be vaccinated. People who are moderately or severely ill should usually wait until they recover before getting a routine (non-exposure) dose of rabies vaccine. If you have been exposed to rabies virus, you should get vaccinated regardless of concurrent illnesses, pregnancy, or breastfeeding.  Your health care provider can give you more information.  4. Risks of a vaccine reaction  Soreness, redness, swelling, or itching at the site of the injection, and headache, nausea, abdominal pain, muscle aches, or dizziness can happen after rabies vaccine. Hives, pain in the joints, or fever sometimes happen after booster doses. Very rarely, nervous system disorders such as Guillain–Barré syndrome (GBS) have been reported after rabies vaccine.People sometimes faint after medical procedures, including vaccination. Tell your provider if you feel dizzy or have vision changes or ringing in the ears.  As with any medicine, there is a very remote chance of a vaccine causing a severe allergic reaction, other serious injury, or death.  5. What if there is a serious problem?  An allergic reaction could occur after the vaccinated person leaves the clinic. If you see signs of a severe allergic reaction (hives, swelling of the face and throat, difficulty breathing, a fast heartbeat, dizziness, or weakness), call 9-1-1 and get the person to the nearest hospital.  For other signs that concern you, call your health care provider.   Adverse reactions should be reported to the Vaccine Adverse Event Reporting System (VAERS). Your health care provider will usually file this report, or you can do it yourself. Visit the VAERS website at www.vaers.[facility].gov or call 1-431.452.8884. VAERS is only for reporting reactions, and VAERS staff do not give medical advice.  6. How can I learn more?  Ask your health care provider. Call your local or state health department. Contact the Centers for Disease Control and Prevention (CDC):  Call 1-730.289.8880 (4-842-ZRY-INFO) orVisit CDC's rabies website at www.cdc.gov/rabiesVaccine Information Statement Rabies Vaccine (01/08/2020)  This information is not intended to replace advice given to you by your health care provider. Make sure you discuss any questions you have with your health care provider.

## 2020-06-01 NOTE — ED PROVIDER NOTE - PATIENT PORTAL LINK FT
You can access the FollowMyHealth Patient Portal offered by [facility] by registering at the following website: [email or web address]. By joining [facility]’s FollowMyHealth portal, you will also be able to view your health information using other applications (apps) compatible with our system.

## 2020-06-01 NOTE — ED PROVIDER NOTE - OBJECTIVE STATEMENT
Dr. Couch: 54M here for 2nd rabies shot. Pt was here on May 29th because he found 2 bats in his house, no known bites or scratches, [facility] was called and pt received vaccine and immunoglobulin. Pt has no complaints.

## 2020-06-01 NOTE — ED ADULT NURSE NOTE - CINV DISCH MEDS REVIEWED YN
CAD (coronary artery disease)    CHF (congestive heart failure)    GERD (gastroesophageal reflux disease)    HTN (hypertension)
No

## 2020-06-01 NOTE — ED ADULT NURSE NOTE - PLAN OF CARE
Chart reviewed.   Requested updates from Care Everywhere.  Immunizations reconciled.   HM updated.     Call bell

## 2020-06-05 ENCOUNTER — EMERGENCY (EMERGENCY)
Facility: HOSPITAL | Age: 55
LOS: 1 days | Discharge: HOME | End: 2020-06-05
Attending: EMERGENCY MEDICINE | Admitting: EMERGENCY MEDICINE
Payer: COMMERCIAL

## 2020-06-05 VITALS
RESPIRATION RATE: 16 BRPM | OXYGEN SATURATION: 96 % | DIASTOLIC BLOOD PRESSURE: 67 MMHG | HEART RATE: 71 BPM | SYSTOLIC BLOOD PRESSURE: 118 MMHG | TEMPERATURE: 99 F | HEIGHT: 74 IN | WEIGHT: 194.01 LBS

## 2020-06-05 DIAGNOSIS — Z20.3 CONTACT WITH AND (SUSPECTED) EXPOSURE TO RABIES: ICD-10-CM

## 2020-06-05 DIAGNOSIS — Z23 ENCOUNTER FOR IMMUNIZATION: ICD-10-CM

## 2020-06-05 PROCEDURE — 99283 EMERGENCY DEPT VISIT LOW MDM: CPT

## 2020-06-05 PROCEDURE — 90675 RABIES VACCINE IM: CPT

## 2020-06-05 PROCEDURE — 99283 EMERGENCY DEPT VISIT LOW MDM: CPT | Mod: 25

## 2020-06-05 PROCEDURE — 90471 IMMUNIZATION ADMIN: CPT

## 2020-06-05 RX ORDER — RABIES VACC, HUMAN DIPLOID/PF 2.5 UNIT
1 VIAL (EA) INTRAMUSCULAR ONCE
Refills: 0 | Status: COMPLETED | OUTPATIENT
Start: 2020-06-05 | End: 2020-06-05

## 2020-06-05 RX ADMIN — Medication 1 MILLILITER(S): at 15:30

## 2020-06-05 NOTE — ED PROVIDER NOTE - NSFOLLOWUPINSTRUCTIONS_ED_ALL_ED_FT
Follow up with your PMD within 1-2 days.  Return for the rabies vaccine day 14 (6/12)  Worsening, continued or ANY new concerning symptoms return to the emergency department.   *****************    Rabies Vaccine: What You Need to Know  1. Why get vaccinated?  Rabies vaccine can prevent rabies.  Rabies is mainly a disease of animals. Humans get rabies when they are bitten or scratched by infected animals.  Human rabies is rare in the [place]. Wild animals like bats, raccoons, skunks, and foxes are the most common source of human rabies infection in the [place].Rabies is more common in other parts of the world where dogs still carry rabies. Most rabies deaths in people around the world are caused by bites from unvaccinated dogs.Rabies infects the central nervous system. After infection with rabies, at first there might not be any symptoms. Weeks or even months after a bite, rabies can cause general weakness or discomfort, fever, or headache. As the disease progresses, the person may experience delirium, abnormal behavior, hallucinations, hydrophobia (fear of water), and insomnia.  If a person does not receive appropriate medical care after an exposure, human rabies is almost always fatal.  Rabies can be prevented by vaccinating pets, staying away from wildlife, and seeking medical care after potential exposures and before symptoms start.  2. Rabies vaccine  Rabies vaccine is given to people at high risk of rabies to protect them if they are exposed. People at high risk of exposure to rabies should be offered pre-exposure rabies vaccination, including:  Veterinarians, animal handlers, and veterinary students Rabies laboratory workers Spel[employer]ers (people who explore caves), and Persons who work with live vaccine to produce rabies vaccine and rabies immune globulin.Pre-exposure rabies vaccination should also be considered for:  People whose activities bring them into frequent contact with rabies virus or with possibly rabid animals. International travelers who are likely to come in contact with animals in parts of the world where rabies is common and immediate access to appropriate care is limited.For pre-exposure protection, 3 doses of rabies vaccine are recommended. People who may be repeatedly exposed to rabies virus should receive periodic testing for immunity, and booster doses might be necessary. Your health care provider can give you more details.  Rabies vaccine can prevent rabies if given to a person after they have had an exposure. Anyone who has been bitten by an animal suspected to have rabies, or who otherwise may have been exposed to rabies, should clean the wound and see a health care provider immediately regardless of vaccination status. The health care provider can help determine if the person should receive post-exposure rabies vaccination.  For post-exposure protection:  A person who is exposed and has never been vaccinated against rabies should get 4 doses of rabies vaccine. The person should also get another shot called rabies immune globulin (RIG). A person who has been previously vaccinated should get 2 doses of rabies vaccine and does not need Rabies Immune Globulin.Your health care provider can give you more information.  3. Talk with your health care provider  Tell your vaccine provider if the person getting the vaccine:   Has had an allergic reaction after a previous dose of rabies vaccine, or has any severe, life-threatening allergies.Has a weakened immune system.In some cases, your health care provider may decide to postpone a routine (non-exposure) dose of rabies vaccination to a future visit.   People with minor illnesses, such as a cold, may be vaccinated. People who are moderately or severely ill should usually wait until they recover before getting a routine (non-exposure) dose of rabies vaccine. If you have been exposed to rabies virus, you should get vaccinated regardless of concurrent illnesses, pregnancy, or breastfeeding.  Your health care provider can give you more information.  4. Risks of a vaccine reaction  Soreness, redness, swelling, or itching at the site of the injection, and headache, nausea, abdominal pain, muscle aches, or dizziness can happen after rabies vaccine. Hives, pain in the joints, or fever sometimes happen after booster doses. Very rarely, nervous system disorders such as Guillain–Barré syndrome (GBS) have been reported after rabies vaccine.People sometimes faint after medical procedures, including vaccination. Tell your provider if you feel dizzy or have vision changes or ringing in the ears.  As with any medicine, there is a very remote chance of a vaccine causing a severe allergic reaction, other serious injury, or death.  5. What if there is a serious problem?  An allergic reaction could occur after the vaccinated person leaves the clinic. If you see signs of a severe allergic reaction (hives, swelling of the face and throat, difficulty breathing, a fast heartbeat, dizziness, or weakness), call 9-1-1 and get the person to the nearest hospital.  For other signs that concern you, call your health care provider.   Adverse reactions should be reported to the Vaccine Adverse Event Reporting System (VAERS). Your health care provider will usually file this report, or you can do it yourself. Visit the VAERS website at www.vaers.[facility].gov or call 1-396.512.8515. VAERS is only for reporting reactions, and VAERS staff do not give medical advice.  6. How can I learn more?  Ask your health care provider. Call your local or state health department. Contact the Centers for Disease Control and Prevention (CDC):  Call 1-427.907.7704 (6-185-OYW-INFO) orVisit CDC's rabies website at www.cdc.gov/rabiesVaccine Information Statement Rabies Vaccine (01/08/2020)  This information is not intended to replace advice given to you by your health care provider. Make sure you discuss any questions you have with your health care provider.

## 2020-06-05 NOTE — ED PROVIDER NOTE - PATIENT PORTAL LINK FT
You can access the FollowMyHealth Patient Portal offered by [facility] by registering at the following website: [email or web address]. By joining [facility]’s FollowMyHealth portal, you will also be able to view your health information using other applications (apps) compatible with our system.

## 2020-06-05 NOTE — ED PROVIDER NOTE - ATTENDING CONTRIBUTION TO CARE
Maranda with NARCISO Borges. 54M here for 3rd rabies shot. Pt was here on May 29th because he found 2 bats in his house, no known bites or scratches, [facility] was called and pt received vaccine and immunoglobulin. Pt has no complaints. Will give dose #3. Will return in 7 days for the final dose.  I performed a face to face bedside interview with patient regarding history of present illness, review of symptoms and past medical history. I completed an independent physical exam.  I have discussed the patient's plan of care with Physician Assistant (PA). I agree with note as stated above, having amended the EMR as needed to reflect my findings.   This includes History of Present Illness, HIV, Past Medical/Surgical/Family/Social History, Allergies and Home Medications, Review of Systems, Physical Exam, and any Progress Notes during the time I functioned as the attending physician for this patient.

## 2020-06-05 NOTE — ED PROVIDER NOTE - CLINICAL SUMMARY MEDICAL DECISION MAKING FREE TEXT BOX
54M here for 3rd rabies shot. Pt was here on May 29th because he found 2 bats in his house, no known bites or scratches, [facility] was called and pt received vaccine and immunoglobulin. Pt has no complaints. Will give dose #3. Will return in 7 days for the final dose.

## 2020-06-05 NOTE — ED ADULT NURSE NOTE - OBJECTIVE STATEMENT
pt came in from home for 3rd rabies shot. Pt was here on May 29th because he found 2 bats in his house, no known bites or scratches, [facility] was called and pt received vaccine and immunoglobulin. Pt has no complaints at this time. Pt denies any n/v/d, chest pain, SOB, blurred vision, headache, dizziness, fever, chills or any other complaint at this time. pt denies any PMH/PSH.

## 2020-06-05 NOTE — ED PROVIDER NOTE - OBJECTIVE STATEMENT
54M here for 3rd rabies shot. Pt was here on May 29th because he found 2 bats in his house, no known bites or scratches, [facility] was called and pt received vaccine and immunoglobulin. Pt has no complaints.

## 2020-06-12 ENCOUNTER — EMERGENCY (EMERGENCY)
Facility: HOSPITAL | Age: 55
LOS: 1 days | Discharge: HOME | End: 2020-06-12
Attending: INTERNAL MEDICINE | Admitting: INTERNAL MEDICINE
Payer: COMMERCIAL

## 2020-06-12 VITALS
WEIGHT: 195.11 LBS | OXYGEN SATURATION: 97 % | HEART RATE: 63 BPM | RESPIRATION RATE: 18 BRPM | TEMPERATURE: 97 F | HEIGHT: 74 IN | SYSTOLIC BLOOD PRESSURE: 114 MMHG | DIASTOLIC BLOOD PRESSURE: 69 MMHG

## 2020-06-12 PROCEDURE — 99283 EMERGENCY DEPT VISIT LOW MDM: CPT | Mod: 25

## 2020-06-12 PROCEDURE — 90675 RABIES VACCINE IM: CPT

## 2020-06-12 PROCEDURE — 90471 IMMUNIZATION ADMIN: CPT

## 2020-06-12 PROCEDURE — 99283 EMERGENCY DEPT VISIT LOW MDM: CPT

## 2020-06-12 RX ORDER — RABIES VACC, HUMAN DIPLOID/PF 2.5 UNIT
1 VIAL (EA) INTRAMUSCULAR ONCE
Refills: 0 | Status: COMPLETED | OUTPATIENT
Start: 2020-06-12 | End: 2020-06-12

## 2020-06-12 RX ADMIN — Medication 1 MILLILITER(S): at 15:39

## 2020-06-12 NOTE — ED PROVIDER NOTE - ATTENDING CONTRIBUTION TO CARE
last rabies vaccine. no complaints  recommended due to bat exposure   Dr. Roca:  I have reviewed and discussed with the PA/ resident the case specifics, including the history, physical assessment, evaluation, conclusion, laboratory results, and medical plan. I agree with the contents, and conclusions. I have personally examined, and interviewed the patient.

## 2020-06-12 NOTE — ED PROVIDER NOTE - OBJECTIVE STATEMENT
54M here for 4th rabies shot. Pt was here on May 29th because he found 2 bats in his house, no known bites or scratches, [facility] was called and pt received vaccine and immunoglobulin. Pt has no complaints.

## 2020-06-12 NOTE — ED PROVIDER NOTE - PATIENT PORTAL LINK FT
You can access the FollowMyHealth Patient Portal offered by [facility] by registering at the following website: [email or web address]. By joining [email or web address]’s FollowMyHealth portal, you will also be able to view your health information using other applications (apps) compatible with our system.

## 2020-06-12 NOTE — ED PROVIDER NOTE - NSFOLLOWUPINSTRUCTIONS_ED_ALL_ED_FT
Follow up with your PMD within 1-2 days.  Return for the rabies vaccine day 14 (6/12)  Worsening, continued or ANY new concerning symptoms return to the emergency department.   *****************    Rabies Vaccine: What You Need to Know  1. Why get vaccinated?  Rabies vaccine can prevent rabies.  Rabies is mainly a disease of animals. Humans get rabies when they are bitten or scratched by infected animals.  Human rabies is rare in the [place]. Wild animals like bats, raccoons, skunks, and foxes are the most common source of human rabies infection in the [place].Rabies is more common in other parts of the world where dogs still carry rabies. Most rabies deaths in people around the world are caused by bites from unvaccinated dogs.Rabies infects the central nervous system. After infection with rabies, at first there might not be any symptoms. Weeks or even months after a bite, rabies can cause general weakness or discomfort, fever, or headache. As the disease progresses, the person may experience delirium, abnormal behavior, hallucinations, hydrophobia (fear of water), and insomnia.  If a person does not receive appropriate medical care after an exposure, human rabies is almost always fatal.  Rabies can be prevented by vaccinating pets, staying away from wildlife, and seeking medical care after potential exposures and before symptoms start.  2. Rabies vaccine  Rabies vaccine is given to people at high risk of rabies to protect them if they are exposed. People at high risk of exposure to rabies should be offered pre-exposure rabies vaccination, including:  Veterinarians, animal handlers, and veterinary students Rabies laboratory workers [employer]el[employer]ers (people who explore caves), and Persons who work with live vaccine to produce rabies vaccine and rabies immune globulin.Pre-exposure rabies vaccination should also be considered for:  People whose activities bring them into frequent contact with rabies virus or with possibly rabid animals. International travelers who are likely to come in contact with animals in parts of the world where rabies is common and immediate access to appropriate care is limited.For pre-exposure protection, 3 doses of rabies vaccine are recommended. People who may be repeatedly exposed to rabies virus should receive periodic testing for immunity, and booster doses might be necessary. Your health care provider can give you more details.  Rabies vaccine can prevent rabies if given to a person after they have had an exposure. Anyone who has been bitten by an animal suspected to have rabies, or who otherwise may have been exposed to rabies, should clean the wound and see a health care provider immediately regardless of vaccination status. The health care provider can help determine if the person should receive post-exposure rabies vaccination.  For post-exposure protection:  A person who is exposed and has never been vaccinated against rabies should get 4 doses of rabies vaccine. The person should also get another shot called rabies immune globulin (RIG). A person who has been previously vaccinated should get 2 doses of rabies vaccine and does not need Rabies Immune Globulin.Your health care provider can give you more information.  3. Talk with your health care provider  Tell your vaccine provider if the person getting the vaccine:   Has had an allergic reaction after a previous dose of rabies vaccine, or has any severe, life-threatening allergies.Has a weakened immune system.In some cases, your health care provider may decide to postpone a routine (non-exposure) dose of rabies vaccination to a future visit.   People with minor illnesses, such as a cold, may be vaccinated. People who are moderately or severely ill should usually wait until they recover before getting a routine (non-exposure) dose of rabies vaccine. If you have been exposed to rabies virus, you should get vaccinated regardless of concurrent illnesses, pregnancy, or breastfeeding.  Your health care provider can give you more information.  4. Risks of a vaccine reaction  Soreness, redness, swelling, or itching at the site of the injection, and headache, nausea, abdominal pain, muscle aches, or dizziness can happen after rabies vaccine. Hives, pain in the joints, or fever sometimes happen after booster doses. Very rarely, nervous system disorders such as Guillain–Barré syndrome (GBS) have been reported after rabies vaccine.People sometimes faint after medical procedures, including vaccination. Tell your provider if you feel dizzy or have vision changes or ringing in the ears.  As with any medicine, there is a very remote chance of a vaccine causing a severe allergic reaction, other serious injury, or death.  5. What if there is a serious problem?  An allergic reaction could occur after the vaccinated person leaves the clinic. If you see signs of a severe allergic reaction (hives, swelling of the face and throat, difficulty breathing, a fast heartbeat, dizziness, or weakness), call 9-1-1 and get the person to the nearest hospital.  For other signs that concern you, call your health care provider.   Adverse reactions should be reported to the Vaccine Adverse Event Reporting System (VAERS). Your health care provider will usually file this report, or you can do it yourself. Visit the VAERS website at www.vaers.[facility].gov or call 1-774.538.1144. VAERS is only for reporting reactions, and VAERS staff do not give medical advice.  6. How can I learn more?  Ask your health care provider. Call your local or state health department. Contact the Centers for Disease Control and Prevention (CDC):  Call 1-379.733.3098 (4-767-ZNY-INFO) orVisit CDC's rabies website at www.cdc.gov/rabiesVaccine Information Statement Rabies Vaccine (01/08/2020)  This information is not intended to replace advice given to you by your health care provider. Make sure you discuss any questions you have with your health care provider. Rabies Vaccine: What You Need to Know  1. Why get vaccinated?  Rabies vaccine can prevent rabies.  Rabies is mainly a disease of animals. Humans get rabies when they are bitten or scratched by infected animals.  Human rabies is rare in the [place]. Wild animals like bats, raccoons, skunks, and foxes are the most common source of human rabies infection in the [place].Rabies is more common in other parts of the world where dogs still carry rabies. Most rabies deaths in people around the world are caused by bites from unvaccinated dogs.Rabies infects the central nervous system. After infection with rabies, at first there might not be any symptoms. Weeks or even months after a bite, rabies can cause general weakness or discomfort, fever, or headache. As the disease progresses, the person may experience delirium, abnormal behavior, hallucinations, hydrophobia (fear of water), and insomnia.  If a person does not receive appropriate medical care after an exposure, human rabies is almost always fatal.  Rabies can be prevented by vaccinating pets, staying away from wildlife, and seeking medical care after potential exposures and before symptoms start.  2. Rabies vaccine  Rabies vaccine is given to people at high risk of rabies to protect them if they are exposed. People at high risk of exposure to rabies should be offered pre-exposure rabies vaccination, including:  Veterinarians, animal handlers, and veterinary students Rabies laboratory workers [employer] (people who explore caves), and Persons who work with live vaccine to produce rabies vaccine and rabies immune globulin.Pre-exposure rabies vaccination should also be considered for:  People whose activities bring them into frequent contact with rabies virus or with possibly rabid animals. International travelers who are likely to come in contact with animals in parts of the world where rabies is common and immediate access to appropriate care is limited.For pre-exposure protection, 3 doses of rabies vaccine are recommended. People who may be repeatedly exposed to rabies virus should receive periodic testing for immunity, and booster doses might be necessary. Your health care provider can give you more details.  Rabies vaccine can prevent rabies if given to a person after they have had an exposure. Anyone who has been bitten by an animal suspected to have rabies, or who otherwise may have been exposed to rabies, should clean the wound and see a health care provider immediately regardless of vaccination status. The health care provider can help determine if the person should receive post-exposure rabies vaccination.  For post-exposure protection:  A person who is exposed and has never been vaccinated against rabies should get 4 doses of rabies vaccine. The person should also get another shot called rabies immune globulin (RIG). A person who has been previously vaccinated should get 2 doses of rabies vaccine and does not need Rabies Immune Globulin.Your health care provider can give you more information.  3. Talk with your health care provider  Tell your vaccine provider if the person getting the vaccine:   Has had an allergic reaction after a previous dose of rabies vaccine, or has any severe, life-threatening allergies.Has a weakened immune system.In some cases, your health care provider may decide to postpone a routine (non-exposure) dose of rabies vaccination to a future visit.   People with minor illnesses, such as a cold, may be vaccinated. People who are moderately or severely ill should usually wait until they recover before getting a routine (non-exposure) dose of rabies vaccine. If you have been exposed to rabies virus, you should get vaccinated regardless of concurrent illnesses, pregnancy, or breastfeeding.  Your health care provider can give you more information.  4. Risks of a vaccine reaction  Soreness, redness, swelling, or itching at the site of the injection, and headache, nausea, abdominal pain, muscle aches, or dizziness can happen after rabies vaccine. Hives, pain in the joints, or fever sometimes happen after booster doses. Very rarely, nervous system disorders such as Guillain–Barré syndrome (GBS) have been reported after rabies vaccine.People sometimes faint after medical procedures, including vaccination. Tell your provider if you feel dizzy or have vision changes or ringing in the ears.  As with any medicine, there is a very remote chance of a vaccine causing a severe allergic reaction, other serious injury, or death.  5. What if there is a serious problem?  An allergic reaction could occur after the vaccinated person leaves the clinic. If you see signs of a severe allergic reaction (hives, swelling of the face and throat, difficulty breathing, a fast heartbeat, dizziness, or weakness), call 9-1-1 and get the person to the nearest hospital.  For other signs that concern you, call your health care provider.   Adverse reactions should be reported to the Vaccine Adverse Event Reporting System (VAERS). Your health care provider will usually file this report, or you can do it yourself. Visit the VAERS website at www.vaers.[facility].gov or call 1-157.522.2454. [facility] is only for reporting reactions, and [facility] staff do not give medical advice.  6. How can I learn more?  Ask your health care provider. Call your local or state health department. Contact the Centers for Disease Control and Prevention (CDC):  Call 1-371.647.7101 (5-145-PAJ-INFO) orVisit CDC's rabies website at www.cdc.gov/rabiesVaccine Information Statement Rabies Vaccine (01/08/2020)  This information is not intended to replace advice given to you by your health care provider. Make sure you discuss any questions you have with your health care provider.

## 2020-10-02 NOTE — ED PROCEDURE NOTE - CPROC ED LACERATION CLEANSED1
copious irrigation You can access the FollowMyHealth Patient Portal offered by [facility] by registering at the following website: [email or web address]. By joining [facility]’s FollowMyHealth portal, you will also be able to view your health information using other applications (apps) compatible with our system.

## 2020-12-15 PROBLEM — Z87.09 HISTORY OF INFLUENZA: Status: RESOLVED | Noted: 2017-02-16 | Resolved: 2020-12-15

## 2020-12-16 PROBLEM — Z12.11 ENCOUNTER FOR SCREENING COLONOSCOPY: Status: RESOLVED | Noted: 2018-11-14 | Resolved: 2020-12-16

## 2023-02-23 ENCOUNTER — NON-APPOINTMENT (OUTPATIENT)
Age: 58
End: 2023-02-23

## 2023-03-27 ENCOUNTER — APPOINTMENT (OUTPATIENT)
Dept: INTERNAL MEDICINE | Facility: CLINIC | Age: 58
End: 2023-03-27
Payer: COMMERCIAL

## 2023-03-27 ENCOUNTER — NON-APPOINTMENT (OUTPATIENT)
Age: 58
End: 2023-03-27

## 2023-03-27 VITALS
HEART RATE: 83 BPM | BODY MASS INDEX: 24.77 KG/M2 | HEIGHT: 74 IN | DIASTOLIC BLOOD PRESSURE: 66 MMHG | WEIGHT: 193 LBS | OXYGEN SATURATION: 98 % | RESPIRATION RATE: 16 BRPM | SYSTOLIC BLOOD PRESSURE: 102 MMHG | TEMPERATURE: 97.7 F

## 2023-03-27 DIAGNOSIS — G89.29 HEADACHE, UNSPECIFIED: ICD-10-CM

## 2023-03-27 DIAGNOSIS — R51.9 HEADACHE, UNSPECIFIED: ICD-10-CM

## 2023-03-27 DIAGNOSIS — Z00.00 ENCOUNTER FOR GENERAL ADULT MEDICAL EXAMINATION W/OUT ABNORMAL FINDINGS: ICD-10-CM

## 2023-03-27 PROCEDURE — 93000 ELECTROCARDIOGRAM COMPLETE: CPT | Mod: 59

## 2023-03-27 PROCEDURE — 99396 PREV VISIT EST AGE 40-64: CPT | Mod: 25

## 2023-03-27 NOTE — HISTORY OF PRESENT ILLNESS
[FreeTextEntry1] : Here for annual [de-identified] : had COVID last mos no major sequelae took Paxlovid felt beter after 72 hours\par did have recurrentce \par \par have felt better but fatigued\par \par some work stress in between jobs

## 2023-03-27 NOTE — HEALTH RISK ASSESSMENT
[Very Good] : ~his/her~  mood as very good [Yes] : Yes [Monthly or less (1 pt)] : Monthly or less (1 point) [1 or 2 (0 pts)] : 1 or 2 (0 points) [Never (0 pts)] : Never (0 points) [No falls in past year] : Patient reported no falls in the past year [0] : 2) Feeling down, depressed, or hopeless: Not at all (0) [PHQ-2 Negative - No further assessment needed] : PHQ-2 Negative - No further assessment needed [Patient reported colonoscopy was normal] : Patient reported colonoscopy was normal [HIV test declined] : HIV test declined [Hepatitis C test declined] : Hepatitis C test declined [None] : None [Audit-CScore] : 1 [de-identified] : paddle, bikes daily [de-identified] : good lost weight [[id number]] : 0 [Change in mental status noted] : No change in mental status noted [Language] : denies difficulty with language [Behavior] : denies difficulty with behavior [Handling Complex Tasks] : denies difficulty handling complex tasks [Reasoning] : denies difficulty with reasoning [ColonoscopyDate] : 2019

## 2023-04-13 LAB
25(OH)D3 SERPL-MCNC: 15.1 NG/ML
ALBUMIN SERPL ELPH-MCNC: 4.2 G/DL
ALP BLD-CCNC: 44 U/L
ALT SERPL-CCNC: 13 U/L
ANION GAP SERPL CALC-SCNC: 12 MMOL/L
APPEARANCE: CLEAR
AST SERPL-CCNC: 14 U/L
BACTERIA: NEGATIVE
BASOPHILS # BLD AUTO: 0.05 K/UL
BASOPHILS NFR BLD AUTO: 1 %
BILIRUB SERPL-MCNC: 0.6 MG/DL
BILIRUBIN URINE: NEGATIVE
BLOOD URINE: NEGATIVE
BUN SERPL-MCNC: 20 MG/DL
CALCIUM SERPL-MCNC: 9.5 MG/DL
CHLORIDE SERPL-SCNC: 103 MMOL/L
CHOLEST SERPL-MCNC: 170 MG/DL
CO2 SERPL-SCNC: 24 MMOL/L
COLOR: NORMAL
CREAT SERPL-MCNC: 1.02 MG/DL
CRP SERPL HS-MCNC: 0.23 MG/L
EGFR: 87 ML/MIN/1.73M2
EOSINOPHIL # BLD AUTO: 0.09 K/UL
EOSINOPHIL NFR BLD AUTO: 1.9 %
ERYTHROCYTE [SEDIMENTATION RATE] IN BLOOD BY WESTERGREN METHOD: 2 MM/HR
ESTIMATED AVERAGE GLUCOSE: 114 MG/DL
GLUCOSE QUALITATIVE U: NEGATIVE
GLUCOSE SERPL-MCNC: 82 MG/DL
HBA1C MFR BLD HPLC: 5.6 %
HCT VFR BLD CALC: 42 %
HDLC SERPL-MCNC: 66 MG/DL
HGB BLD-MCNC: 13.9 G/DL
HYALINE CASTS: 0 /LPF
IMM GRANULOCYTES NFR BLD AUTO: 0.2 %
KETONES URINE: NEGATIVE
LDLC SERPL CALC-MCNC: 93 MG/DL
LEUKOCYTE ESTERASE URINE: NEGATIVE
LYMPHOCYTES # BLD AUTO: 1.84 K/UL
LYMPHOCYTES NFR BLD AUTO: 37.9 %
MAN DIFF?: NORMAL
MCHC RBC-ENTMCNC: 29.8 PG
MCHC RBC-ENTMCNC: 33.1 GM/DL
MCV RBC AUTO: 89.9 FL
MICROSCOPIC-UA: NORMAL
MONOCYTES # BLD AUTO: 0.67 K/UL
MONOCYTES NFR BLD AUTO: 13.8 %
NEUTROPHILS # BLD AUTO: 2.19 K/UL
NEUTROPHILS NFR BLD AUTO: 45.2 %
NITRITE URINE: NEGATIVE
NONHDLC SERPL-MCNC: 104 MG/DL
PH URINE: 6.5
PLATELET # BLD AUTO: 256 K/UL
POTASSIUM SERPL-SCNC: 4.8 MMOL/L
PROT SERPL-MCNC: 6 G/DL
PROTEIN URINE: NORMAL
PSA SERPL-MCNC: 0.32 NG/ML
RBC # BLD: 4.67 M/UL
RBC # FLD: 12.5 %
RED BLOOD CELLS URINE: 2 /HPF
SODIUM SERPL-SCNC: 140 MMOL/L
SPECIFIC GRAVITY URINE: 1.02
SQUAMOUS EPITHELIAL CELLS: 0 /HPF
TRIGL SERPL-MCNC: 53 MG/DL
TSH SERPL-ACNC: 3.39 UIU/ML
UROBILINOGEN URINE: NORMAL
WBC # FLD AUTO: 4.85 K/UL
WHITE BLOOD CELLS URINE: 0 /HPF
